# Patient Record
Sex: MALE | Race: WHITE | Employment: OTHER | URBAN - METROPOLITAN AREA
[De-identification: names, ages, dates, MRNs, and addresses within clinical notes are randomized per-mention and may not be internally consistent; named-entity substitution may affect disease eponyms.]

---

## 2022-11-03 ENCOUNTER — HOSPITAL ENCOUNTER (EMERGENCY)
Age: 47
Discharge: HOME | End: 2022-11-03
Attending: EMERGENCY MEDICINE
Payer: COMMERCIAL

## 2022-11-03 VITALS
BODY MASS INDEX: 30.27 KG/M2 | RESPIRATION RATE: 16 BRPM | DIASTOLIC BLOOD PRESSURE: 87 MMHG | HEART RATE: 74 BPM | SYSTOLIC BLOOD PRESSURE: 132 MMHG | HEIGHT: 69 IN | WEIGHT: 204.37 LBS | OXYGEN SATURATION: 99 % | TEMPERATURE: 97.2 F

## 2022-11-03 DIAGNOSIS — H10.12 ALLERGIC CONJUNCTIVITIS OF LEFT EYE: Primary | ICD-10-CM

## 2022-11-03 PROCEDURE — 6370000000 HC RX 637 (ALT 250 FOR IP): Performed by: EMERGENCY MEDICINE

## 2022-11-03 PROCEDURE — 99283 EMERGENCY DEPT VISIT LOW MDM: CPT

## 2022-11-03 RX ORDER — CETIRIZINE HYDROCHLORIDE 10 MG/1
10 TABLET ORAL ONCE
Status: COMPLETED | OUTPATIENT
Start: 2022-11-03 | End: 2022-11-03

## 2022-11-03 RX ORDER — OLOPATADINE HYDROCHLORIDE 1 MG/ML
1 SOLUTION/ DROPS OPHTHALMIC 2 TIMES DAILY
Qty: 5 ML | Refills: 0 | Status: SHIPPED | OUTPATIENT
Start: 2022-11-03 | End: 2022-11-10

## 2022-11-03 RX ORDER — CETIRIZINE HYDROCHLORIDE 10 MG/1
10 TABLET ORAL DAILY
Qty: 7 TABLET | Refills: 0 | Status: SHIPPED | OUTPATIENT
Start: 2022-11-03 | End: 2022-11-10

## 2022-11-03 RX ORDER — TETRACAINE HYDROCHLORIDE 5 MG/ML
1 SOLUTION OPHTHALMIC ONCE
Status: COMPLETED | OUTPATIENT
Start: 2022-11-03 | End: 2022-11-03

## 2022-11-03 RX ADMIN — TETRACAINE HYDROCHLORIDE 1 DROP: 5 SOLUTION OPHTHALMIC at 20:47

## 2022-11-03 RX ADMIN — FLUORESCEIN SODIUM 1 MG: 1 STRIP OPHTHALMIC at 20:47

## 2022-11-03 RX ADMIN — CETIRIZINE HYDROCHLORIDE 10 MG: 10 TABLET, FILM COATED ORAL at 20:47

## 2022-11-03 ASSESSMENT — PAIN - FUNCTIONAL ASSESSMENT
PAIN_FUNCTIONAL_ASSESSMENT: NONE - DENIES PAIN
PAIN_FUNCTIONAL_ASSESSMENT: NONE - DENIES PAIN

## 2022-11-04 ASSESSMENT — ENCOUNTER SYMPTOMS
FACIAL SWELLING: 1
SHORTNESS OF BREATH: 0
EYE ITCHING: 1
PHOTOPHOBIA: 0
EYE REDNESS: 0
NAUSEA: 0
ABDOMINAL PAIN: 0
EYE DISCHARGE: 1
EYE PAIN: 0

## 2022-11-04 ASSESSMENT — VISUAL ACUITY: OU: 1

## 2022-11-04 NOTE — DISCHARGE INSTRUCTIONS
You have been provided with a printed prescription. Be sure to contact your primary care doctor's office to arrange for a follow up visit. If you have any new or worsening issues after going home don't hesitate to return here for reevaluation at any time 24/7!

## 2022-11-04 NOTE — ED PROVIDER NOTES
[address] [place] [address] EMERGENCY DEPARTMENT    Name: Celsa Chew : 1975 MRN: [id number] Date of Service: 11/3/2022    /87   Pulse 74   Temp 97.2 °F (36.2 °C) (Oral)   Resp 16   Ht 5' 9\" (1.753 m)   Wt 204 lb 5.9 oz (92.7 kg)   SpO2 99%   BMI 30.18 kg/m²     CC: eye irritation, swelling around left eye    HPI: this patient is a 42 y.o. male presenting to the ED from home. Mr. Diane Steward tells me that earlier today he was working on a car when he believes either some hydraulic fluid or some debris got into his left eye. He immediately had some irritation and itching to that eye. He returned home and flushed his eye copiously with tap water for several minutes consecutively. This seemed to help his irritation significantly. Nonetheless, shortly thereafter he began to notice excessive watering from that eye. Later the area just beneath that eye seemed to start swelling. This concerned him, prompting him to come to this department fore valuation. He has not appreciated any other changes from his usual state of health and he denies other current complaints. Specifically, he has not noticed any eye pain or any changes in his vision whatsoever. He adds that he has had similar episodes like this previously related to environmental \"allergies\" and/or \"allergic reactions. \"  _____________________________________________________________________    Past Medical History:   Diagnosis Date    Asthma     Class 1 obesity     Environmental allergies        History reviewed. No pertinent surgical history. Social History     Tobacco Use    Smoking status: Former     Types: Cigarettes    Smokeless tobacco: Current     Types: Chew   Substance Use Topics    Alcohol use: Yes    Drug use: No       History reviewed. No pertinent family history. _____________________________________________________________________    Review of Systems   Constitutional:  Negative for chills, fatigue and fever.    HENT:  Positive for facial swelling. Negative for dental problem. Eyes:  Positive for discharge (watery) and itching. Negative for photophobia, pain, redness and visual disturbance. Respiratory:  Negative for shortness of breath. Cardiovascular:  Negative for chest pain. Gastrointestinal:  Negative for abdominal pain and nausea. Musculoskeletal:  Negative for neck pain. Skin:  Negative for rash. Allergic/Immunologic: Positive for environmental allergies. Negative for immunocompromised state. Neurological:  Negative for dizziness and headaches. Physical Exam  Constitutional:       General: He is awake. He is not in acute distress. Appearance: He is not ill-appearing, toxic-appearing or diaphoretic. HENT:      Head: Normocephalic and atraumatic. Jaw: No trismus, swelling or malocclusion. Right Ear: Tympanic membrane, ear canal and external ear normal.      Left Ear: Tympanic membrane, ear canal and external ear normal.      Nose: Nose normal.      Mouth/Throat:      Mouth: Mucous membranes are moist.      Dentition: No gingival swelling or dental abscesses. Pharynx: Oropharynx is clear. Comments: Minimal edema is present to the left inferior periorbital soft tissues. Eyes:      General: Lids are everted, no foreign bodies appreciated. Vision grossly intact. Gaze aligned appropriately. Left eye: No discharge or hordeolum. Extraocular Movements: Extraocular movements intact. Right eye: Normal extraocular motion and no nystagmus. Left eye: Normal extraocular motion and no nystagmus. Conjunctiva/sclera: Conjunctivae normal.      Left eye: Left conjunctiva is not injected. Chemosis (minimal) present. No exudate or hemorrhage. Pupils: Pupils are equal, round, and reactive to light. Left eye: No corneal abrasion or fluorescein uptake. Dulce exam negative. Funduscopic exam:        Left eye: No papilledema.       Slit lamp exam:     Left eye: No corneal ulcer or photophobia. Visual Fields: Right eye visual fields normal and left eye visual fields normal.   Neck:      Vascular: No JVD. Cardiovascular:      Rate and Rhythm: Normal rate and regular rhythm. Heart sounds: Normal heart sounds. No murmur heard. Pulmonary:      Effort: Pulmonary effort is normal. No respiratory distress. Breath sounds: Normal breath sounds. Abdominal:      General: There is no distension. Palpations: Abdomen is soft. Tenderness: There is no abdominal tenderness. There is no guarding or rebound. Skin:     General: Skin is warm and dry. Coloration: Skin is not cyanotic, mottled or pale. Neurological:      Mental Status: He is alert and oriented to person, place, and time. Psychiatric:         Speech: Speech normal.         Behavior: Behavior normal.     _____________________________________________________________________    Is this patient to be included in the SEP-1 Core Measure? No  _____________________________________________________________________    MEDICAL DECISION MAKING & PLANS:    I reviewed the patient's recent and/or pertinent records, current medications, nurses notes, allergies, and vital signs.     Differential Diagnosis:  Chemical conjunctivitis, allergic conjunctivitis, conjunctival FB    Treatments:  Medications   tetracaine (TETRAVISC) 0.5 % ophthalmic solution 1 drop (1 drop Left Eye Given 11/3/22 2047)   fluorescein ophthalmic strip 1 mg (1 mg Left Eye Given 11/3/22 2047)   cetirizine (ZYRTEC) tablet 10 mg (10 mg Oral Given 11/3/22 2047)     Discharge Medication List as of 11/3/2022  8:45 PM        START taking these medications    Details   cetirizine (ZYRTEC) 10 MG tablet Take 1 tablet by mouth daily for 7 days, Disp-7 tablet, R-0Print      olopatadine (PATANOL) 0.1 % ophthalmic solution Place 1 drop into the left eye 2 times daily for 7 days, Disp-5 mL, R-0Print             Disposition:  Mr. Estrella Tang is very well-appearing on my evaluation in the ED this evening. His presenting signs and symptoms are consistent with allergic - or less likely chemical - conjunctivitis. His exam findings are largely reassuring and he is not having any current eye pain or vision disturbances. Discussed exam findings, Dx, and expected clinical course with him at length. He is comfortable with DC home now. Return precautions reviewed in detail and outpatient follow up advised. _____________________________________________________________________    Clinical Impression(s)  1. Allergic conjunctivitis of left eye        Provider Signature: MD Jeff Boudreaux MD  11/04/22 [id number]

## 2024-05-27 ENCOUNTER — HOSPITAL ENCOUNTER (EMERGENCY)
Age: 49
Discharge: HOME | End: 2024-05-27
Payer: COMMERCIAL

## 2024-05-27 VITALS
RESPIRATION RATE: 18 BRPM | SYSTOLIC BLOOD PRESSURE: 123 MMHG | DIASTOLIC BLOOD PRESSURE: 84 MMHG | HEIGHT: 69 IN | OXYGEN SATURATION: 97 % | HEART RATE: 69 BPM | WEIGHT: 198.85 LBS | BODY MASS INDEX: 29.45 KG/M2 | TEMPERATURE: 97.4 F

## 2024-05-27 DIAGNOSIS — H57.13 EYE PAIN, BILATERAL: Primary | ICD-10-CM

## 2024-05-27 DIAGNOSIS — S05.01XA ABRASION OF RIGHT CORNEA, INITIAL ENCOUNTER: ICD-10-CM

## 2024-05-27 PROCEDURE — 6370000000 HC RX 637 (ALT 250 FOR IP): Performed by: PHYSICIAN ASSISTANT

## 2024-05-27 PROCEDURE — 99283 EMERGENCY DEPT VISIT LOW MDM: CPT

## 2024-05-27 RX ORDER — TETRACAINE HYDROCHLORIDE 5 MG/ML
1 SOLUTION OPHTHALMIC ONCE
Status: DISCONTINUED | OUTPATIENT
Start: 2024-05-27 | End: 2024-05-27 | Stop reason: HOSPADM

## 2024-05-27 RX ORDER — ERYTHROMYCIN 5 MG/G
OINTMENT OPHTHALMIC
Qty: 3.5 G | Refills: 0 | Status: SHIPPED | OUTPATIENT
Start: 2024-05-27

## 2024-05-27 RX ORDER — ERYTHROMYCIN 5 MG/G
OINTMENT OPHTHALMIC ONCE
Status: COMPLETED | OUTPATIENT
Start: 2024-05-27 | End: 2024-05-27

## 2024-05-27 RX ADMIN — ERYTHROMYCIN: 5 OINTMENT OPHTHALMIC at 17:48

## 2024-05-27 ASSESSMENT — ENCOUNTER SYMPTOMS
PHOTOPHOBIA: 0
EYE DISCHARGE: 0
EYE PAIN: 1

## 2024-05-27 ASSESSMENT — PAIN - FUNCTIONAL ASSESSMENT: PAIN_FUNCTIONAL_ASSESSMENT: NONE - DENIES PAIN

## 2024-05-27 ASSESSMENT — LIFESTYLE VARIABLES
HOW MANY STANDARD DRINKS CONTAINING ALCOHOL DO YOU HAVE ON A TYPICAL DAY: 3 OR 4
HOW OFTEN DO YOU HAVE A DRINK CONTAINING ALCOHOL: MONTHLY OR LESS

## 2024-05-27 ASSESSMENT — VISUAL ACUITY
OD: 20/70
OU: 20/50
OS: 20/50

## 2024-05-27 NOTE — ED PROVIDER NOTES
[facility] EMERGENCY DEPARTMENT  EMERGENCY DEPARTMENT ENCOUNTER        Pt Name: Facundo Stewart  MRN: [id number]  Birthdate 1975  Date of evaluation: 5/27/2024  Provider: JO Pimentel  PCP: Cheikh Spencer MD  Note Started: 6:42 PM EDT 5/27/24      GIANCARLO. I have evaluated this patient.        CHIEF COMPLAINT       Chief Complaint   Patient presents with    Eye Pain     Pt to ED from home c/o right eye pain. Pt states \"i was pressure washing a fence Friday and I feel like there is sand in my eyes\"       HISTORY OF PRESENT ILLNESS: 1 or more Elements     History from : Patient    Limitations to history : None    Facundo Stewart is a 48 y.o. male who presents complaining of bilateral eye pain and irritation is worse on the right.  Is been going on since Friday.  He was pressure washing a metal fence with some pain on it.  He has been irrigating the eyes with minimal to no relief.  He was wearing goggles.  He does not wear contacts or glasses.  No fevers tetanus is up-to-date.    Nursing Notes were all reviewed and agreed with or any disagreements were addressed in the HPI.    REVIEW OF SYSTEMS :      Review of Systems   Eyes:  Positive for pain. Negative for photophobia, discharge and visual disturbance.   Skin:  Negative for wound.   Psychiatric/Behavioral:  Negative for agitation, behavioral problems and confusion.        Positives and Pertinent negatives as per HPI.     SURGICAL HISTORY   History reviewed. No pertinent surgical history.    CURRENTMEDICATIONS       Discharge Medication List as of 5/27/2024  5:28 PM        CONTINUE these medications which have NOT CHANGED    Details   ibuprofen (ADVIL;MOTRIN) 800 MG tablet Take 1 tablet by mouth every 8 hours as needed for Pain or Fever, Disp-20 tablet, R-0Print      pantoprazole (PROTONIX) 20 MG tablet Take 1 tablet by mouth daily, Disp-30 tablet, R-0Print             ALLERGIES     Penicillins    FAMILYHISTORY     History reviewed. No